# Patient Record
Sex: MALE | Race: WHITE
[De-identification: names, ages, dates, MRNs, and addresses within clinical notes are randomized per-mention and may not be internally consistent; named-entity substitution may affect disease eponyms.]

---

## 2020-07-18 ENCOUNTER — HOSPITAL ENCOUNTER (EMERGENCY)
Dept: HOSPITAL 97 - ER | Age: 40
LOS: 1 days | Discharge: HOME | End: 2020-07-19
Payer: SELF-PAY

## 2020-07-18 DIAGNOSIS — N20.1: Primary | ICD-10-CM

## 2020-07-18 PROCEDURE — 83690 ASSAY OF LIPASE: CPT

## 2020-07-18 PROCEDURE — 80048 BASIC METABOLIC PNL TOTAL CA: CPT

## 2020-07-18 PROCEDURE — 96375 TX/PRO/DX INJ NEW DRUG ADDON: CPT

## 2020-07-18 PROCEDURE — 99284 EMERGENCY DEPT VISIT MOD MDM: CPT

## 2020-07-18 PROCEDURE — 76377 3D RENDER W/INTRP POSTPROCES: CPT

## 2020-07-18 PROCEDURE — 81003 URINALYSIS AUTO W/O SCOPE: CPT

## 2020-07-18 PROCEDURE — 96361 HYDRATE IV INFUSION ADD-ON: CPT

## 2020-07-18 PROCEDURE — 74176 CT ABD & PELVIS W/O CONTRAST: CPT

## 2020-07-18 PROCEDURE — 81015 MICROSCOPIC EXAM OF URINE: CPT

## 2020-07-18 PROCEDURE — 96365 THER/PROPH/DIAG IV INF INIT: CPT

## 2020-07-18 PROCEDURE — 36415 COLL VENOUS BLD VENIPUNCTURE: CPT

## 2020-07-18 PROCEDURE — 80076 HEPATIC FUNCTION PANEL: CPT

## 2020-07-18 PROCEDURE — 85025 COMPLETE CBC W/AUTO DIFF WBC: CPT

## 2020-07-19 VITALS — SYSTOLIC BLOOD PRESSURE: 127 MMHG | OXYGEN SATURATION: 99 % | TEMPERATURE: 98 F | DIASTOLIC BLOOD PRESSURE: 69 MMHG

## 2020-07-19 LAB
ALBUMIN SERPL BCP-MCNC: 4.1 G/DL (ref 3.4–5)
ALP SERPL-CCNC: 67 U/L (ref 45–117)
ALT SERPL W P-5'-P-CCNC: 27 U/L (ref 12–78)
AST SERPL W P-5'-P-CCNC: 19 U/L (ref 15–37)
BUN BLD-MCNC: 16 MG/DL (ref 7–18)
GLUCOSE SERPLBLD-MCNC: 111 MG/DL (ref 74–106)
HCT VFR BLD CALC: 41.9 % (ref 39.6–49)
LIPASE SERPL-CCNC: 87 U/L (ref 73–393)
LYMPHOCYTES # SPEC AUTO: 1.8 K/UL (ref 0.7–4.9)
PMV BLD: 9.6 FL (ref 7.6–11.3)
POTASSIUM SERPL-SCNC: 3.1 MMOL/L (ref 3.5–5.1)
RBC # BLD: 4.41 M/UL (ref 4.33–5.43)
UA COMPLETE W REFLEX CULTURE PNL UR: (no result)

## 2020-07-19 NOTE — ER
Nurse's Notes                                                                                     

 [facility]                                                                 

Name: Joey Cabral II                                                                             

Age: 40 yrs                                                                                       

Sex: Male                                                                                         

: 1980                                                                                   

MRN: [id number]                                                                                   

Arrival Date: 2020                                                                          

Time: 23:36                                                                                       

Account#: [id number]                                                                            

Bed 6                                                                                             

Private MD:                                                                                       

Diagnosis: Calculus of ureter-left UVJ                                                            

                                                                                                  

Presentation:                                                                                     

                                                                                             

23:46 Chief complaint: Patient states: Lower abd pain since  tonight with N/V. Unable to  ll1 

      urinate well, no blood noted. Had left flank pain earlier. Coronavirus screen: Patient      

      denies a cough. Patient denies shortness of breath or difficulty breathing. Patient         

      denies measured and/or subjective temperature greater than 100.4F prior to today's          

      visit. Patient denies travel on a cruise ship or to a country the [facility] currently lists       

      as an affected area. Patient denies contact with known and/or suspected case of             

      COVID-19. Proceed with normal triage. Ebola Screen: Patient denies travel to an             

      Ebola-affected area in the 21 days before illness onset. Initial Sepsis Screen: Does        

      the patient meet any 2 criteria? No. Patient's initial sepsis screen is negative. Risk      

      Assessment: Do you want to hurt yourself or someone else? Patient reports no desire to      

      harm self or others. Onset of symptoms was 2020.                                   

23:46 Method Of Arrival: Ambulatory                                                           [facility] 

23:46 Acuity: [facility] 3                                                                           ll1 

                                                                                             

00:32 Initial Sepsis Screen: Does the patient have a suspected source of infection? No.       rv  

      Patient's initial sepsis screen is negative.                                                

                                                                                                  

Historical:                                                                                       

- Allergies:                                                                                      

                                                                                             

23:47 No Known Allergies;                                                                     ll1 

- PSHx:                                                                                           

23:47 None;                                                                                   ll1 

                                                                                                  

- Immunization history:: Adult Immunizations unknown.                                             

- Social history:: Smoking status: Patient denies any tobacco usage or history of.                

  Patient uses alcohol, only on a social basis. weekly. Patient/guardian denies using             

  street drugs.                                                                                   

                                                                                                  

                                                                                                  

Screenin/19                                                                                             

00:32 Abuse screen: Denies threats or abuse. Denies injuries from another. Nutritional        rv  

      screening: No deficits noted. Tuberculosis screening: No symptoms or risk factors           

      identified. Fall Risk None identified.                                                      

                                                                                                  

Assessment:                                                                                       

00:31 General: Appears uncomfortable, Behavior is calm, cooperative. Pain: Complains of pain  rv  

      in left lower quadrant Pain radiates to left side to the back/flank Pain currently is 8     

      out of 10 on a pain scale. Neuro: Level of Consciousness is awake, alert, obeys             

      commands, Oriented to person, place, time, situation. Cardiovascular: Patient's skin is     

      warm and dry. Respiratory: Airway is patent. GI: Bowel sounds present X 4 quads.            

      Abdomen is tender to palpation in left lower quadrant. Derm: Skin is intact.                

                                                                                                  

Vital Signs:                                                                                      

                                                                                             

23:46  / 78; Pulse 55; Resp 18; Temp 98.4; Pulse Ox 96% ; Pain 8/10;                    ll1 

                                                                                             

01:31  / 69; Pulse 59; Resp 16; Pulse Ox 98% on R/A;                                    rv  

03:20  / 69; Pulse 64; Resp 17; Temp 98; Pulse Ox 99% on R/A;                           rv  

                                                                                                  

ED Course:                                                                                        

                                                                                             

23:36 Patient arrived in ED.                                                                  do  

23:47 Triage completed.                                                                       ll1 

23:47 Arm band placed on Patient placed in an exam room, on a stretcher.                      ll1 

                                                                                             

00:07 Rodrigo Watkins, RN is Primary Nurse.                                                  rv  

00:12 Brendan Greenfield PA is PHCP.                                                                cp  

00:12 Mirza Byrne MD is Attending Physician.                                             cp  

00:20 Inserted saline lock: 18 gauge in right antecubital area, using aseptic technique.      rv  

      Blood collected.                                                                            

00:20 Initial lab(s) drawn, by me, sent to lab.                                               rv  

00:32 Patient has correct armband on for positive identification. Pulse ox on. NIBP on.       rv  

01:48 CT Stone Protocol In Process Unspecified.                                               EDMS

02:56 Kelton Blancas MD is Referral Physician.                                              cp  

03:20 No provider procedures requiring assistance completed. IV discontinued, intact,         rv  

      bleeding controlled, No redness/swelling at site. Pressure dressing applied.                

                                                                                                  

Administered Medications:                                                                         

00:42 Drug: TORadol - Ketorolac 15 mg Route: IVP; Site: right forearm;                        rv  

01:32 Follow up: Response: No adverse reaction; Pain is decreased                             rv  

00:44 Drug: NS 0.9% 1000 ml Route: IV; Rate: 1 bolus; Site: right forearm;                    rv  

01:32 Follow up: IV Status: Completed infusion; IV Intake: 1000ml                             rv  

02:43 Drug: Flomax 0.4 mg Route: PO;                                                          rv  

03:20 Follow up: Response: No adverse reaction                                                rv  

02:43 Drug: Magnesium Sulfate 1 grams Route: IVPB; Infused Over: 1 hrs; Site: right           rv  

      antecubital;                                                                                

03:19 Follow up: IV Status: Completed infusion                                                rv  

02:44 Drug: Rocephin 1 grams Route: IV; Rate: calculated rate; Site: right antecubital;       rv  

03:19 Follow up: Response: No adverse reaction; IV Status: Completed infusion                 rv  

03:08 Drug: Potassium Effervescent Tablet 50 mEq Route: PO;                                   rv  

03:19 Follow up: Response: Medication administered at discharge.                              rv  

                                                                                                  

                                                                                                  

Intake:                                                                                           

01:32 IV: 1000ml; Total: 1000ml.                                                              rv  

                                                                                                  

Outcome:                                                                                          

02:57 Discharge ordered by MD.                                                                ayden  

03:20 Discharged to home ambulatory.                                                          rv  

03:20 Condition: improved                                                                         

03:20 Discharge instructions given to patient, Instructed on discharge instructions, follow       

      up and referral plans. medication usage, Demonstrated understanding of instructions,        

      follow-up care, medications, Prescriptions given X 4.                                       

03:21 Patient left the ED.                                                                    rv  

                                                                                                  

Signatures:                                                                                       

Dispatcher [employer]                           EDMS                                                 

Brendan Greenfield PA PA cp Ogletree, Danielle do Vicente, Ronaldo RN                    RN   Johnna Gonzalez RN                       RN   ll1                                                  

                                                                                                  

**************************************************************************************************

## 2020-07-19 NOTE — EDPHYS
Physician Documentation                                                                           

 [facility]                                                                 

Name: Joey Cabral II                                                                             

Age: 40 yrs                                                                                       

Sex: Male                                                                                         

: 1980                                                                                   

MRN: [id number]                                                                                   

Arrival Date: 2020                                                                          

Time: 23:36                                                                                       

Account#: [id number]                                                                            

Bed 6                                                                                             

Private MD:                                                                                       

ED Physician Mirza Byrne                                                                      

HPI:                                                                                              

                                                                                             

00:10 This 40 yrs old  Male presents to ER via Ambulatory with complaints of         cp  

      Abdominal Pain, Vomiting.                                                                   

00:10 The patient presents with abdominal pain in the left lower quadrant.                    cp  

00:10 Onset: The symptoms/episode began/occurred last night. The symptoms radiate to the left   

      flank.                                                                                      

00:10 Associated signs and symptoms: Pertinent positives: urinary frequency, Pertinent        cp  

      negatives: constipation, diarrhea, dysuria, fever, testicular pain. The symptoms are        

      described as waxing/waning.                                                                 

                                                                                                  

Historical:                                                                                       

- Allergies:                                                                                      

                                                                                             

23:47 No Known Allergies;                                                                     ll1 

- PSHx:                                                                                           

23:47 None;                                                                                   ll1 

                                                                                                  

- Immunization history:: Adult Immunizations unknown.                                             

- Social history:: Smoking status: Patient denies any tobacco usage or history of.                

  Patient uses alcohol, only on a social basis. weekly. Patient/guardian denies using             

  street drugs.                                                                                   

                                                                                                  

                                                                                                  

ROS:                                                                                              

                                                                                             

00:15 Constitutional: Negative for body aches, chills, fever, poor PO intake.                 cp  

00:15 Eyes: Negative for injury, pain, redness, and discharge.                                cp  

00:15 Cardiovascular: Negative for chest pain.                                                    

00:15 Respiratory: Negative for cough, shortness of breath, wheezing.                             

00:15 Abdomen/GI: Positive for abdominal pain, nausea, vomiting, Negative for diarrhea,           

      constipation, black/tarry stool, rectal bleeding.                                           

00:15 Back: Positive for flank pain, on the left.                                                 

00:15 : Positive for urinary urgency, Negative for burning with urination, difficulty           

      urinating, bladder incontinence, testicular pain                                            

00:15 MS/extremity: Negative for injury or acute deformity, decreased range of motion, pain,      

      paresthesias.                                                                               

00:15 Skin: Negative for rash.                                                                    

00:15 Neuro: Negative for altered mental status, headache, weakness.                              

00:15 All other systems are negative.                                                             

                                                                                                  

Exam:                                                                                             

00:20 Constitutional: The patient appears in no acute distress, alert, awake, non-toxic, well cp  

      developed, well nourished.                                                                  

00:20 Head/Face:  Normocephalic, atraumatic.                                                  cp  

00:20 Eyes: Periorbital structures: appear normal, Conjunctiva: normal, no exudate, no            

      injection, Sclera: no appreciated abnormality, Lids and lashes: appear normal,              

      bilaterally.                                                                                

00:20 ENT: External ear(s): are unremarkable, Nose: is normal, Posterior pharynx: Airway: no      

      evidence of obstruction, patent.                                                            

00:20 Chest/axilla: Inspection: normal, Palpation: is normal, no crepitus, no tenderness.         

00:20 Cardiovascular: Rate: normal, Rhythm: regular.                                              

00:20 Respiratory: the patient does not display signs of respiratory distress,  Respirations:     

      normal, no use of accessory muscles, no retractions, labored breathing, is not present,     

      Breath sounds: are clear throughout, no decreased breath sounds.                            

00:20 Abdomen/GI: Inspection: abdomen appears normal, Bowel sounds: active, all quadrants,        

      Palpation: soft, in all quadrants, mild abdominal tenderness, in the anterior aspect of     

      left lateral abdomen and left lower quadrant, rebound tenderness, is not appreciated,       

      voluntary guarding, is not appreciated, involuntary guarding, is not appreciated.           

00:20 Back: CVA tenderness, is absent.                                                            

00:20 Musculoskeletal/extremity: Exam is negative for calf tenderness, decreased range of         

      motion, deformity, injury.                                                                  

00:20 Skin: no rash present.                                                                      

00:20 Neuro: Orientation: to person, place \T\ time. Mentation: is normal, Motor: moves all       

      fours, strength is normal.                                                                  

                                                                                                  

Vital Signs:                                                                                      

                                                                                             

23:46  / 78; Pulse 55; Resp 18; Temp 98.4; Pulse Ox 96% ; Pain 8/10;                    ll1 

                                                                                             

01:31  / 69; Pulse 59; Resp 16; Pulse Ox 98% on R/A;                                    rv  

03:20  / 69; Pulse 64; Resp 17; Temp 98; Pulse Ox 99% on R/A;                           rv  

                                                                                                  

MDM:                                                                                              

00:15 Patient medically screened.                                                             cp  

01:00 Differential diagnosis: diverticulitis, Pyelonephritis, Testicular Torsion,             cp  

      Ureterolithiasis, urinary tract infection.                                                  

02:55 Data reviewed: vital signs, nurses notes, lab test result(s), radiologic studies, CT    cp  

      scan.                                                                                       

02:55 Counseling: I had a detailed discussion with the patient and/or guardian regarding: the cp  

      historical points, exam findings, and any diagnostic results supporting the                 

      discharge/admit diagnosis, lab results, radiology results, to return to the emergency       

      department if symptoms worsen or persist or if there are any questions or concerns that     

      arise at home. Response to treatment: the patient's symptoms have markedly improved         

      after treatment, and as a result, I will discharge patient. ED course: VSS. Pain            

      markedly improved. Will discharge to home for continued monitoring.                         

                                                                                                  

                                                                                             

00:07 Order name: Basic Metabolic Panel; Complete Time: 00:47                                 rv  

                                                                                             

00:07 Order name: CBC with Diff; Complete Time: 00:47                                         rv  

                                                                                             

00:07 Order name: Hepatic Function; Complete Time: 00:48                                      rv  

                                                                                             

00:07 Order name: Lipase; Complete Time: 00:48                                                rv  

                                                                                             

00:47 Order name: Urine Microscopic Only                                                        

                                                                                             

00:48 Order name: Urine Microscopic Only                                                      EDMS

                                                                                             

00:31 Order name: CT Stone Protocol                                                             

                                                                                             

02:39 Order name: Urine Dipstick--Ancillary (enter results); Complete Time: 02:55             sg  

                                                                                             

00:07 Order name: IV Saline Lock; Complete Time: 00:31                                        rv  

                                                                                             

00:07 Order name: Labs collected and sent; Complete Time: 00:31                               rv  

                                                                                             

00:47 Order name: Urine Dipstick-Ancillary (obtain specimen); Complete Time: 02:39            cp  

                                                                                             

02:56 Order name: Urine Strainer; Complete Time: 03:02                                        cp  

                                                                                                  

Administered Medications:                                                                         

00:42 Drug: TORadol - Ketorolac 15 mg Route: IVP; Site: right forearm;                        rv  

01:32 Follow up: Response: No adverse reaction; Pain is decreased                             rv  

00:44 Drug: NS 0.9% 1000 ml Route: IV; Rate: 1 bolus; Site: right forearm;                    rv  

01:32 Follow up: IV Status: Completed infusion; IV Intake: 1000ml                             rv  

02:43 Drug: Flomax 0.4 mg Route: PO;                                                          rv  

03:20 Follow up: Response: No adverse reaction                                                rv  

02:43 Drug: Magnesium Sulfate 1 grams Route: IVPB; Infused Over: 1 hrs; Site: right           rv  

      antecubital;                                                                                

03:19 Follow up: IV Status: Completed infusion                                                rv  

02:44 Drug: Rocephin 1 grams Route: IV; Rate: calculated rate; Site: right antecubital;       rv  

03:19 Follow up: Response: No adverse reaction; IV Status: Completed infusion                 rv  

03:08 Drug: Potassium Effervescent Tablet 50 mEq Route: PO;                                   rv  

03:19 Follow up: Response: Medication administered at discharge.                              rv  

                                                                                                  

                                                                                                  

Disposition:                                                                                      

20 02:57 Discharged to Home. Impression: Calculus of ureter - left UVJ.                     

- Condition is Stable.                                                                            

- Discharge Instructions: Kidney Stones, Renal Colic.                                             

- Prescriptions for Tylenol- Codeine #3 300-30 mg Oral Tablet - take 2 tablets by ORAL            

  route every 6 hours As needed; 20 tablet. Zofran 4 mg Oral Tablet - take 1 tablet by            

  ORAL route every 12 hours As needed; 20 tablet. Flomax 0.4 mg Oral Capsule, Sust.               

  Release 24 hr - take 1 capsule by ORAL route once daily 1/2 hour following the same             

  meal each day; 30 capsule. Cipro 500 mg Oral Tablet - take 1 tablet by ORAL route               

  every 12 hours for 7 days; 14 tablet.                                                           

- Medication Reconciliation Form, Thank You Letter, Antibiotic Education, Prescription            

  Opioid Use form.                                                                                

- Follow up: Kelton Blancas MD; When: 2 - 3 days; Reason: pain continues, worsening             

  of condition.                                                                                   

- Problem is new.                                                                                 

- Symptoms have improved.                                                                         

                                                                                                  

                                                                                                  

                                                                                                  

Addendum:                                                                                         

2020                                                                                        

     07:21 Co-signature as Attending Physician, Mirza Byrne MD.                                 m


                                                                                                  

Signatures:                                                                                       

Dispatcher [employer]                           EDMS                                                 

Brendan Greenfield PA PA cp Vicente, Ronaldo, RN RN   rv                                                   

Johnna Will RN                       RN   1                                                  

Mirza Byrne MD MD   [id number]                                                  

                                                                                                  

Corrections: (The following items were deleted from the chart)                                    

                                                                                             

03:21 02:57 2020 02:57 Discharged to Home. Impression: Calculus of ureter - left UVJ.   rv  

      Condition is Stable. Forms are Medication Reconciliation Form, Thank You Letter,            

      Antibiotic Education, Prescription Opioid Use. Follow up: Kelton Blancas; When: 2 - 3       

      days; Reason: pain continues, worsening of condition. Problem is new. Symptoms have         

      improved. cp                                                                                

                                                                                                  

**************************************************************************************************

## 2020-07-20 NOTE — RAD REPORT
EXAM DESCRIPTION:   CT Abdomen and Pelvis Without Intravenous Contrast

 

CLINICAL HISTORY:  The patient is 40 years old and is Male; left flank pain

 

TECHNIQUE:  Axial computed tomography images of the abdomen and pelvis without intravenous contrast. 
  Sagittal and coronal reformatted images were created and reviewed.   This CT exam was performed usi
ng one or more of the following dose reduction techniques:   automated exposure control, adjustment o
f the mA and/or kV according to patient size, and/or use of iterative reconstruction technique.

 

COMPARISON:  No relevant prior studies available.

 

FINDINGS:  LUNG BASES:   Unremarkable.   No mass.   No consolidation.

ABDOMEN:

   LIVER:   Homogeneous without focal mass.

   GALLBLADDER AND BILE DUCTS:   No calcified stones.   No ductal dilation.

   PANCREAS:   Unremarkable.   No ductal dilation.

   SPLEEN:   Unremarkable.

   ADRENALS:   Unremarkable.   No mass.

   KIDNEYS AND URETERS:   Mild left hydroureteronephrosis is present secondary to a 7 mm left UVJ lina
culus.   Mild left perinephric and periureteral stranding is present. The right kidney is unremarkabl
e.

   STOMACH AND BOWEL:   The stomach is moderately fluid-filled. The small bowel is normal in caliber.
 Stool is present throughout the colon. There is no mucosal thickening or evidence of bowel obstructi
on.

PELVIS:

   APPENDIX:   The appendix is normal in caliber without surrounding inflammation.

   BLADDER:   Unremarkable.   No stones.

   REPRODUCTIVE:   Unremarkable as visualized.

ABDOMEN and PELVIS:

   INTRAPERITONEAL SPACE:   Unremarkable.   No free air.   No significant fluid collection.

   BONES/JOINTS:   No acute fracture.

   SOFT TISSUES:   The soft tissues are normal.

   VASCULATURE:   Unremarkable.   No abdominal aortic aneurysm.

   LYMPH NODES:   Unremarkable. No enlarged lymph nodes.

 

IMPRESSION:  Mild left hydroureteronephrosis is present secondary to a 7 mm left UVJ calculus.

 

Electronically signed by:   Amita Sanchez MD   7/19/2020 1:58 AM CDT Workstation: [id number]

 

 

Due to temporary technical issues with the PACS/Fluency reporting system, reports are being signed by
 the in house radiologist without review as a courtesy to ensure prompt reporting. The interpreting r
adiologist is fully responsible for the content of the report.